# Patient Record
Sex: FEMALE | Race: BLACK OR AFRICAN AMERICAN
[De-identification: names, ages, dates, MRNs, and addresses within clinical notes are randomized per-mention and may not be internally consistent; named-entity substitution may affect disease eponyms.]

---

## 2017-04-19 ENCOUNTER — HOSPITAL ENCOUNTER (OUTPATIENT)
Dept: HOSPITAL 23 - CCAT | Age: 37
Discharge: HOME | End: 2017-04-19
Payer: COMMERCIAL

## 2017-04-19 DIAGNOSIS — R55: Primary | ICD-10-CM

## 2017-04-19 DIAGNOSIS — R53.83: ICD-10-CM

## 2020-02-13 ENCOUNTER — APPOINTMENT (OUTPATIENT)
Dept: WOMENS IMAGING | Facility: HOSPITAL | Age: 40
End: 2020-02-13

## 2020-02-13 PROCEDURE — 77063 BREAST TOMOSYNTHESIS BI: CPT | Performed by: RADIOLOGY

## 2020-02-13 PROCEDURE — 77067 SCR MAMMO BI INCL CAD: CPT | Performed by: RADIOLOGY

## 2020-09-18 ENCOUNTER — TRANSCRIBE ORDERS (OUTPATIENT)
Dept: PREADMISSION TESTING | Facility: HOSPITAL | Age: 40
End: 2020-09-18

## 2020-09-18 DIAGNOSIS — Z01.818 OTHER SPECIFIED PRE-OPERATIVE EXAMINATION: Primary | ICD-10-CM

## 2020-09-22 ENCOUNTER — APPOINTMENT (OUTPATIENT)
Dept: PREADMISSION TESTING | Facility: HOSPITAL | Age: 40
End: 2020-09-22

## 2020-09-22 VITALS
RESPIRATION RATE: 16 BRPM | TEMPERATURE: 99 F | WEIGHT: 208 LBS | HEIGHT: 67 IN | OXYGEN SATURATION: 100 % | HEART RATE: 72 BPM | DIASTOLIC BLOOD PRESSURE: 82 MMHG | BODY MASS INDEX: 32.65 KG/M2 | SYSTOLIC BLOOD PRESSURE: 132 MMHG

## 2020-09-22 LAB
ABO GROUP BLD: NORMAL
BLD GP AB SCN SERPL QL: NEGATIVE
DEPRECATED RDW RBC AUTO: 44.5 FL (ref 37–54)
ERYTHROCYTE [DISTWIDTH] IN BLOOD BY AUTOMATED COUNT: 16.1 % (ref 12.3–15.4)
HCG SERPL QL: NEGATIVE
HCT VFR BLD AUTO: 33.4 % (ref 34–46.6)
HGB BLD-MCNC: 10.2 G/DL (ref 12–15.9)
MCH RBC QN AUTO: 23.4 PG (ref 26.6–33)
MCHC RBC AUTO-ENTMCNC: 30.5 G/DL (ref 31.5–35.7)
MCV RBC AUTO: 76.6 FL (ref 79–97)
PLATELET # BLD AUTO: 205 10*3/MM3 (ref 140–450)
PMV BLD AUTO: 10 FL (ref 6–12)
RBC # BLD AUTO: 4.36 10*6/MM3 (ref 3.77–5.28)
RH BLD: POSITIVE
T&S EXPIRATION DATE: NORMAL
WBC # BLD AUTO: 4.82 10*3/MM3 (ref 3.4–10.8)

## 2020-09-22 PROCEDURE — 84703 CHORIONIC GONADOTROPIN ASSAY: CPT | Performed by: OBSTETRICS & GYNECOLOGY

## 2020-09-22 PROCEDURE — 86901 BLOOD TYPING SEROLOGIC RH(D): CPT | Performed by: OBSTETRICS & GYNECOLOGY

## 2020-09-22 PROCEDURE — 86900 BLOOD TYPING SEROLOGIC ABO: CPT | Performed by: OBSTETRICS & GYNECOLOGY

## 2020-09-22 PROCEDURE — 36415 COLL VENOUS BLD VENIPUNCTURE: CPT

## 2020-09-22 PROCEDURE — 86850 RBC ANTIBODY SCREEN: CPT | Performed by: OBSTETRICS & GYNECOLOGY

## 2020-09-22 PROCEDURE — 85027 COMPLETE CBC AUTOMATED: CPT | Performed by: OBSTETRICS & GYNECOLOGY

## 2020-09-22 NOTE — DISCHARGE INSTRUCTIONS
Take the following medications the morning of surgery:  NONE    ARRIVAL TIME TO MAIN SURGERY ON 9/28/20 IS 5:30 AM      If you are on prescription narcotic pain medication to control your pain you may also take that medication the morning of surgery.    General Instructions:  • Do not eat solid food after midnight the night before surgery.  • You may drink clear liquids day of surgery but must stop at least one hour before your hospital arrival time.  • It is beneficial for you to have a clear drink that contains carbohydrates the day of surgery.  We suggest a 12 to 20 ounce bottle of Gatorade or Powerade for non-diabetic patients or a 12 to 20 ounce bottle of G2 or Powerade Zero for diabetic patients. (Pediatric patients, are not advised to drink a 12 to 20 ounce carbohydrate drink)    Clear liquids are liquids you can see through.  Nothing red in color.     Plain water                               Sports drinks  Sodas                                   Gelatin (Jell-O)  Fruit juices without pulp such as white grape juice and apple juice  Popsicles that contain no fruit or yogurt  Tea or coffee (no cream or milk added)  Gatorade / Powerade  G2 / Powerade Zero    • Infants may have breast milk up to four hours before surgery.  • Infants drinking formula may drink formula up to six hours before surgery.   • Patients who avoid smoking, chewing tobacco and alcohol for 4 weeks prior to surgery have a reduced risk of post-operative complications.  Quit smoking as many days before surgery as you can.  • Do not smoke, use chewing tobacco or drink alcohol the day of surgery.   • If applicable bring your C-PAP/ BI-PAP machine.  • Bring any papers given to you in the doctor’s office.  • Wear clean comfortable clothes.  • Do not wear contact lenses, false eyelashes or make-up.  Bring a case for your glasses.   • Bring crutches or walker if applicable.  • Remove all piercings.  Leave jewelry and any other valuables at  home.  • Hair extensions with metal clips must be removed prior to surgery.  • The Pre-Admission Testing nurse will instruct you to bring medications if unable to obtain an accurate list in Pre-Admission Testing.        If you were given a blood bank ID arm band remember to bring it with you the day of surgery.    Preventing a Surgical Site Infection:  • For 2 to 3 days before surgery, avoid shaving with a razor because the razor can irritate skin and make it easier to develop an infection.    • Any areas of open skin can increase the risk of a post-operative wound infection by allowing bacteria to enter and travel throughout the body.  Notify your surgeon if you have any skin wounds / rashes even if it is not near the expected surgical site.  The area will need assessed to determine if surgery should be delayed until it is healed.  • The night prior to surgery shower using a fresh bar of anti-bacterial soap (such as Dial) and clean washcloth.  Sleep in a clean bed with clean clothing.  Do not allow pets to sleep with you.  • Shower on the morning of surgery using a fresh bar of anti-bacterial soap (such as Dial) and clean washcloth.  Dry with a clean towel and dress in clean clothing.  • Ask your surgeon if you will be receiving antibiotics prior to surgery.  • Make sure you, your family, and all healthcare providers clean their hands with soap and water or an alcohol based hand  before caring for you or your wound.    Day of surgery:  Your arrival time is approximately two hours before your scheduled surgery time.  Upon arrival, a Pre-op nurse and Anesthesiologist will review your health history, obtain vital signs, and answer questions you may have.  The only belongings needed at this time will be a list of your home medications and if applicable your C-PAP/BI-PAP machine.  If you are staying overnight your family can leave the rest of your belongings in the car and bring them to your room later.  A  Pre-op nurse will start an IV and you may receive medication in preparation for surgery, including something to help you relax.  Your family will be able to see you in the Pre-op area.  Two visitors at a time will be allowed in the Pre-op room.  While you are in surgery your family should notify the waiting room  if they leave the waiting room area and provide a contact phone number.    Please be aware that surgery does come with discomfort.  We want to make every effort to control your discomfort so please discuss any uncontrolled symptoms with your nurse.   Your doctor will most likely have prescribed pain medications.      If you are going home after surgery you will receive individualized written care instructions before being discharged.  A responsible adult must drive you to and from the hospital on the day of your surgery and stay with you for 24 hours.    If you are staying overnight following surgery, you will be transported to your hospital room following the recovery period.  [facility] has all private rooms.    If you have any questions please call Pre-Admission Testing at [phone].  Deductibles and co-payments are collected on the day of service. Please be prepared to pay the required co-pay, deductible or deposit on the day of service as defined by your plan.    Patient Education for Self-Quarantine Process    Following your COVID testing, we strongly recommend that you do not leave your home after you have been tested for COVID except to get medical care. This includes not going to work, school or to public areas.  If this is not possible for you to do please limit your activities to only required outings.  Be sure to wear a mask when you are with other people, practice social distancing and wash your hands frequently.      The following items provide additional details to keep you safe.  • Wash your hands with soap and water frequently for at least 20 seconds.    • Avoid touching your eyes, nose and mouth with unwashed hands.  • Do not share anything - utensils, towels, food from the same bowl.   • Have your own utensils, drinking glass, dishes, towels and bedding.   • Do not have visitors.   • Do use FaceTime to stay in touch with family and friends.  • You should stay in a specific room away from others if possible.   • Stay at least 6 feet away from others in the home if you cannot have a dedicated room to yourself.   • Do not snuggle with your pet. While the CDC says there is no evidence that pets can spread COVID-19 or be infected from humans, it is probably best to avoid “petting, snuggling, being kissed or licked and sharing food (during self-quarantine)”, according to the CDC.   • Sanitize household surfaces daily. Include all high touch areas (door handles, light switches, phones, countertops, etc.)  • Do not share a bathroom with others, if possible.   • Wear a mask around others in your home if you are unable to stay in a separate room or 6 feet apart. If  you are unable to wear a mask, have your family member wear a mask if they must be within 6 feet of you.   Call your surgeon immediately if you experience any of the following symptoms:  • Sore Throat  • Shortness of Breath or difficulty breathing  • Cough  • Chills  • Body soreness or muscle pain  • Headache  • Fever  • New loss of taste or smell  • Do not arrive for your surgery ill.  Your procedure will need to be rescheduled to another time.  You will need to call your physician before the day of surgery to avoid any unnecessary exposure to hospital staff as well as other patients.

## 2020-09-25 ENCOUNTER — LAB (OUTPATIENT)
Dept: LAB | Facility: HOSPITAL | Age: 40
End: 2020-09-25

## 2020-09-25 DIAGNOSIS — Z01.818 OTHER SPECIFIED PRE-OPERATIVE EXAMINATION: ICD-10-CM

## 2020-09-25 PROCEDURE — C9803 HOPD COVID-19 SPEC COLLECT: HCPCS

## 2020-09-25 PROCEDURE — U0004 COV-19 TEST NON-CDC HGH THRU: HCPCS

## 2020-09-26 LAB — SARS-COV-2 RNA RESP QL NAA+PROBE: NOT DETECTED

## 2020-09-28 ENCOUNTER — ANESTHESIA EVENT (OUTPATIENT)
Dept: PERIOP | Facility: HOSPITAL | Age: 40
End: 2020-09-28

## 2020-09-28 ENCOUNTER — ANESTHESIA (OUTPATIENT)
Dept: PERIOP | Facility: HOSPITAL | Age: 40
End: 2020-09-28

## 2020-09-28 ENCOUNTER — HOSPITAL ENCOUNTER (OUTPATIENT)
Facility: HOSPITAL | Age: 40
Discharge: HOME | End: 2020-09-29
Attending: OBSTETRICS & GYNECOLOGY | Admitting: OBSTETRICS & GYNECOLOGY

## 2020-09-28 DIAGNOSIS — D25.0 INTRAMURAL AND SUBMUCOUS LEIOMYOMA OF UTERUS: Primary | ICD-10-CM

## 2020-09-28 DIAGNOSIS — D21.9 FIBROIDS: ICD-10-CM

## 2020-09-28 DIAGNOSIS — D25.1 INTRAMURAL AND SUBMUCOUS LEIOMYOMA OF UTERUS: Primary | ICD-10-CM

## 2020-09-28 PROBLEM — D50.0 IRON DEFICIENCY ANEMIA DUE TO CHRONIC BLOOD LOSS: Status: ACTIVE | Noted: 2020-09-28

## 2020-09-28 PROBLEM — N92.0 MENORRHAGIA WITH REGULAR CYCLE: Status: ACTIVE | Noted: 2020-09-28

## 2020-09-28 PROBLEM — D25.9 FIBROID, UTERINE: Status: ACTIVE | Noted: 2020-09-28

## 2020-09-28 PROCEDURE — 25010000003 CEFAZOLIN IN DEXTROSE 2-4 GM/100ML-% SOLUTION: Performed by: OBSTETRICS & GYNECOLOGY

## 2020-09-28 PROCEDURE — 25010000002 DEXAMETHASONE PER 1 MG: Performed by: NURSE ANESTHETIST, CERTIFIED REGISTERED

## 2020-09-28 PROCEDURE — 25010000002 FENTANYL CITRATE (PF) 100 MCG/2ML SOLUTION: Performed by: ANESTHESIOLOGY

## 2020-09-28 PROCEDURE — 25010000002 ONDANSETRON PER 1 MG: Performed by: NURSE ANESTHETIST, CERTIFIED REGISTERED

## 2020-09-28 PROCEDURE — 25010000002 NEOSTIGMINE PER 0.5 MG: Performed by: NURSE ANESTHETIST, CERTIFIED REGISTERED

## 2020-09-28 PROCEDURE — 25010000002 HYDROMORPHONE 1 MG/ML SOLUTION: Performed by: NURSE ANESTHETIST, CERTIFIED REGISTERED

## 2020-09-28 PROCEDURE — 25010000002 PROPOFOL 10 MG/ML EMULSION: Performed by: NURSE ANESTHETIST, CERTIFIED REGISTERED

## 2020-09-28 PROCEDURE — 25010000002 ROPIVACAINE PER 1 MG: Performed by: OBSTETRICS & GYNECOLOGY

## 2020-09-28 PROCEDURE — 88307 TISSUE EXAM BY PATHOLOGIST: CPT | Performed by: OBSTETRICS & GYNECOLOGY

## 2020-09-28 PROCEDURE — 25010000002 MIDAZOLAM PER 1 MG: Performed by: ANESTHESIOLOGY

## 2020-09-28 DEVICE — KNOTLESS TISSUE CONTROL DEVICE, VIOLET UNIDIRECTIONAL (ANTIBACTERIAL) SYNTHETIC ABSORBABLE DEVICE
Type: IMPLANTABLE DEVICE | Site: ABDOMEN | Status: FUNCTIONAL
Brand: STRATAFIX

## 2020-09-28 RX ORDER — ONDANSETRON 4 MG/1
4 TABLET, FILM COATED ORAL EVERY 6 HOURS PRN
Status: DISCONTINUED | OUTPATIENT
Start: 2020-09-28 | End: 2020-09-29 | Stop reason: HOSPADM

## 2020-09-28 RX ORDER — HYDROMORPHONE HYDROCHLORIDE 1 MG/ML
0.5 INJECTION, SOLUTION INTRAMUSCULAR; INTRAVENOUS; SUBCUTANEOUS
Status: DISCONTINUED | OUTPATIENT
Start: 2020-09-28 | End: 2020-09-29 | Stop reason: HOSPADM

## 2020-09-28 RX ORDER — CEFAZOLIN SODIUM 2 G/100ML
2 INJECTION, SOLUTION INTRAVENOUS ONCE
Status: COMPLETED | OUTPATIENT
Start: 2020-09-28 | End: 2020-09-28

## 2020-09-28 RX ORDER — DEXAMETHASONE SODIUM PHOSPHATE 4 MG/ML
INJECTION, SOLUTION INTRA-ARTICULAR; INTRALESIONAL; INTRAMUSCULAR; INTRAVENOUS; SOFT TISSUE AS NEEDED
Status: DISCONTINUED | OUTPATIENT
Start: 2020-09-28 | End: 2020-09-28 | Stop reason: SURG

## 2020-09-28 RX ORDER — ONDANSETRON 2 MG/ML
4 INJECTION INTRAMUSCULAR; INTRAVENOUS EVERY 6 HOURS PRN
Status: DISCONTINUED | OUTPATIENT
Start: 2020-09-28 | End: 2020-09-29 | Stop reason: HOSPADM

## 2020-09-28 RX ORDER — CALCIUM CARBONATE 200(500)MG
2 TABLET,CHEWABLE ORAL 3 TIMES DAILY PRN
Status: DISCONTINUED | OUTPATIENT
Start: 2020-09-28 | End: 2020-09-29 | Stop reason: HOSPADM

## 2020-09-28 RX ORDER — SODIUM CHLORIDE, SODIUM LACTATE, POTASSIUM CHLORIDE, CALCIUM CHLORIDE 600; 310; 30; 20 MG/100ML; MG/100ML; MG/100ML; MG/100ML
9 INJECTION, SOLUTION INTRAVENOUS CONTINUOUS
Status: DISCONTINUED | OUTPATIENT
Start: 2020-09-28 | End: 2020-09-28 | Stop reason: HOSPADM

## 2020-09-28 RX ORDER — DIPHENHYDRAMINE HCL 25 MG
25 CAPSULE ORAL
Status: DISCONTINUED | OUTPATIENT
Start: 2020-09-28 | End: 2020-09-28 | Stop reason: HOSPADM

## 2020-09-28 RX ORDER — ONDANSETRON 2 MG/ML
INJECTION INTRAMUSCULAR; INTRAVENOUS AS NEEDED
Status: DISCONTINUED | OUTPATIENT
Start: 2020-09-28 | End: 2020-09-28 | Stop reason: SURG

## 2020-09-28 RX ORDER — ROCURONIUM BROMIDE 10 MG/ML
INJECTION, SOLUTION INTRAVENOUS AS NEEDED
Status: DISCONTINUED | OUTPATIENT
Start: 2020-09-28 | End: 2020-09-28 | Stop reason: SURG

## 2020-09-28 RX ORDER — FLUMAZENIL 0.1 MG/ML
0.2 INJECTION INTRAVENOUS AS NEEDED
Status: DISCONTINUED | OUTPATIENT
Start: 2020-09-28 | End: 2020-09-28 | Stop reason: HOSPADM

## 2020-09-28 RX ORDER — SODIUM CHLORIDE 9 MG/ML
INJECTION, SOLUTION INTRAVENOUS AS NEEDED
Status: DISCONTINUED | OUTPATIENT
Start: 2020-09-28 | End: 2020-09-28 | Stop reason: HOSPADM

## 2020-09-28 RX ORDER — SCOLOPAMINE TRANSDERMAL SYSTEM 1 MG/1
1 PATCH, EXTENDED RELEASE TRANSDERMAL ONCE
Status: DISCONTINUED | OUTPATIENT
Start: 2020-09-28 | End: 2020-09-28

## 2020-09-28 RX ORDER — FENTANYL CITRATE 50 UG/ML
50 INJECTION, SOLUTION INTRAMUSCULAR; INTRAVENOUS
Status: DISCONTINUED | OUTPATIENT
Start: 2020-09-28 | End: 2020-09-28 | Stop reason: HOSPADM

## 2020-09-28 RX ORDER — HYDROMORPHONE HYDROCHLORIDE 1 MG/ML
0.5 INJECTION, SOLUTION INTRAMUSCULAR; INTRAVENOUS; SUBCUTANEOUS
Status: DISCONTINUED | OUTPATIENT
Start: 2020-09-28 | End: 2020-09-28 | Stop reason: HOSPADM

## 2020-09-28 RX ORDER — IBUPROFEN 600 MG/1
600 TABLET ORAL EVERY 6 HOURS PRN
Status: DISCONTINUED | OUTPATIENT
Start: 2020-09-28 | End: 2020-09-29 | Stop reason: HOSPADM

## 2020-09-28 RX ORDER — ONDANSETRON 2 MG/ML
4 INJECTION INTRAMUSCULAR; INTRAVENOUS ONCE AS NEEDED
Status: DISCONTINUED | OUTPATIENT
Start: 2020-09-28 | End: 2020-09-28 | Stop reason: HOSPADM

## 2020-09-28 RX ORDER — LABETALOL HYDROCHLORIDE 5 MG/ML
10 INJECTION, SOLUTION INTRAVENOUS ONCE
Status: DISCONTINUED | OUTPATIENT
Start: 2020-09-28 | End: 2020-09-28 | Stop reason: HOSPADM

## 2020-09-28 RX ORDER — OXYCODONE AND ACETAMINOPHEN 7.5; 325 MG/1; MG/1
1 TABLET ORAL ONCE AS NEEDED
Status: DISCONTINUED | OUTPATIENT
Start: 2020-09-28 | End: 2020-09-28 | Stop reason: HOSPADM

## 2020-09-28 RX ORDER — PROMETHAZINE HYDROCHLORIDE 25 MG/1
25 TABLET ORAL ONCE AS NEEDED
Status: DISCONTINUED | OUTPATIENT
Start: 2020-09-28 | End: 2020-09-28 | Stop reason: HOSPADM

## 2020-09-28 RX ORDER — GLYCOPYRROLATE 0.2 MG/ML
INJECTION INTRAMUSCULAR; INTRAVENOUS AS NEEDED
Status: DISCONTINUED | OUTPATIENT
Start: 2020-09-28 | End: 2020-09-28 | Stop reason: SURG

## 2020-09-28 RX ORDER — MIDAZOLAM HYDROCHLORIDE 1 MG/ML
1 INJECTION INTRAMUSCULAR; INTRAVENOUS
Status: DISCONTINUED | OUTPATIENT
Start: 2020-09-28 | End: 2020-09-28 | Stop reason: HOSPADM

## 2020-09-28 RX ORDER — OXYCODONE AND ACETAMINOPHEN 10; 325 MG/1; MG/1
1 TABLET ORAL EVERY 4 HOURS PRN
Status: DISCONTINUED | OUTPATIENT
Start: 2020-09-28 | End: 2020-09-29 | Stop reason: HOSPADM

## 2020-09-28 RX ORDER — FAMOTIDINE 10 MG/ML
20 INJECTION, SOLUTION INTRAVENOUS ONCE
Status: COMPLETED | OUTPATIENT
Start: 2020-09-28 | End: 2020-09-28

## 2020-09-28 RX ORDER — LIDOCAINE HYDROCHLORIDE 10 MG/ML
0.5 INJECTION, SOLUTION EPIDURAL; INFILTRATION; INTRACAUDAL; PERINEURAL ONCE AS NEEDED
Status: DISCONTINUED | OUTPATIENT
Start: 2020-09-28 | End: 2020-09-28 | Stop reason: HOSPADM

## 2020-09-28 RX ORDER — SODIUM CHLORIDE 0.9 % (FLUSH) 0.9 %
3 SYRINGE (ML) INJECTION EVERY 12 HOURS SCHEDULED
Status: DISCONTINUED | OUTPATIENT
Start: 2020-09-28 | End: 2020-09-28 | Stop reason: HOSPADM

## 2020-09-28 RX ORDER — SODIUM CHLORIDE 0.9 % (FLUSH) 0.9 %
3-10 SYRINGE (ML) INJECTION AS NEEDED
Status: DISCONTINUED | OUTPATIENT
Start: 2020-09-28 | End: 2020-09-28 | Stop reason: HOSPADM

## 2020-09-28 RX ORDER — DIPHENHYDRAMINE HYDROCHLORIDE 50 MG/ML
12.5 INJECTION INTRAMUSCULAR; INTRAVENOUS
Status: DISCONTINUED | OUTPATIENT
Start: 2020-09-28 | End: 2020-09-28 | Stop reason: HOSPADM

## 2020-09-28 RX ORDER — TEMAZEPAM 15 MG/1
15 CAPSULE ORAL NIGHTLY PRN
Status: DISCONTINUED | OUTPATIENT
Start: 2020-09-28 | End: 2020-09-29 | Stop reason: HOSPADM

## 2020-09-28 RX ORDER — PROMETHAZINE HYDROCHLORIDE 25 MG/1
25 SUPPOSITORY RECTAL ONCE AS NEEDED
Status: DISCONTINUED | OUTPATIENT
Start: 2020-09-28 | End: 2020-09-28 | Stop reason: HOSPADM

## 2020-09-28 RX ORDER — SODIUM CHLORIDE, SODIUM LACTATE, POTASSIUM CHLORIDE, CALCIUM CHLORIDE 600; 310; 30; 20 MG/100ML; MG/100ML; MG/100ML; MG/100ML
100 INJECTION, SOLUTION INTRAVENOUS CONTINUOUS
Status: DISCONTINUED | OUTPATIENT
Start: 2020-09-28 | End: 2020-09-29 | Stop reason: HOSPADM

## 2020-09-28 RX ORDER — HYDROCODONE BITARTRATE AND ACETAMINOPHEN 7.5; 325 MG/1; MG/1
1 TABLET ORAL ONCE AS NEEDED
Status: DISCONTINUED | OUTPATIENT
Start: 2020-09-28 | End: 2020-09-28 | Stop reason: HOSPADM

## 2020-09-28 RX ORDER — EPHEDRINE SULFATE 50 MG/ML
5 INJECTION, SOLUTION INTRAVENOUS ONCE AS NEEDED
Status: DISCONTINUED | OUTPATIENT
Start: 2020-09-28 | End: 2020-09-28 | Stop reason: HOSPADM

## 2020-09-28 RX ORDER — MAGNESIUM HYDROXIDE 1200 MG/15ML
LIQUID ORAL AS NEEDED
Status: DISCONTINUED | OUTPATIENT
Start: 2020-09-28 | End: 2020-09-28 | Stop reason: HOSPADM

## 2020-09-28 RX ORDER — OXYCODONE HYDROCHLORIDE AND ACETAMINOPHEN 5; 325 MG/1; MG/1
1 TABLET ORAL EVERY 4 HOURS PRN
Status: DISCONTINUED | OUTPATIENT
Start: 2020-09-28 | End: 2020-09-29 | Stop reason: HOSPADM

## 2020-09-28 RX ORDER — NALOXONE HCL 0.4 MG/ML
0.2 VIAL (ML) INJECTION AS NEEDED
Status: DISCONTINUED | OUTPATIENT
Start: 2020-09-28 | End: 2020-09-28 | Stop reason: HOSPADM

## 2020-09-28 RX ORDER — SIMETHICONE 80 MG
80 TABLET,CHEWABLE ORAL 4 TIMES DAILY PRN
Status: DISCONTINUED | OUTPATIENT
Start: 2020-09-28 | End: 2020-09-29 | Stop reason: HOSPADM

## 2020-09-28 RX ORDER — PROPOFOL 10 MG/ML
VIAL (ML) INTRAVENOUS AS NEEDED
Status: DISCONTINUED | OUTPATIENT
Start: 2020-09-28 | End: 2020-09-28 | Stop reason: SURG

## 2020-09-28 RX ORDER — LABETALOL HYDROCHLORIDE 5 MG/ML
5 INJECTION, SOLUTION INTRAVENOUS
Status: DISCONTINUED | OUTPATIENT
Start: 2020-09-28 | End: 2020-09-28 | Stop reason: HOSPADM

## 2020-09-28 RX ORDER — NALOXONE HCL 0.4 MG/ML
0.1 VIAL (ML) INJECTION
Status: DISCONTINUED | OUTPATIENT
Start: 2020-09-28 | End: 2020-09-29 | Stop reason: HOSPADM

## 2020-09-28 RX ORDER — LIDOCAINE HYDROCHLORIDE 20 MG/ML
INJECTION, SOLUTION INFILTRATION; PERINEURAL AS NEEDED
Status: DISCONTINUED | OUTPATIENT
Start: 2020-09-28 | End: 2020-09-28 | Stop reason: SURG

## 2020-09-28 RX ORDER — ROPIVACAINE HYDROCHLORIDE 5 MG/ML
INJECTION, SOLUTION EPIDURAL; INFILTRATION; PERINEURAL AS NEEDED
Status: DISCONTINUED | OUTPATIENT
Start: 2020-09-28 | End: 2020-09-28 | Stop reason: HOSPADM

## 2020-09-28 RX ADMIN — FENTANYL CITRATE 100 MCG: 50 INJECTION INTRAMUSCULAR; INTRAVENOUS at 07:39

## 2020-09-28 RX ADMIN — GLYCOPYRROLATE 0.2 MG: 0.2 INJECTION INTRAMUSCULAR; INTRAVENOUS at 08:34

## 2020-09-28 RX ADMIN — PROPOFOL 25 MCG/KG/MIN: 10 INJECTION, EMULSION INTRAVENOUS at 07:50

## 2020-09-28 RX ADMIN — SODIUM CHLORIDE, POTASSIUM CHLORIDE, SODIUM LACTATE AND CALCIUM CHLORIDE 100 ML/HR: 600; 310; 30; 20 INJECTION, SOLUTION INTRAVENOUS at 15:44

## 2020-09-28 RX ADMIN — ROCURONIUM BROMIDE 10 MG: 10 INJECTION INTRAVENOUS at 09:49

## 2020-09-28 RX ADMIN — OXYCODONE HYDROCHLORIDE AND ACETAMINOPHEN 1 TABLET: 10; 325 TABLET ORAL at 20:35

## 2020-09-28 RX ADMIN — IBUPROFEN 600 MG: 600 TABLET, FILM COATED ORAL at 13:04

## 2020-09-28 RX ADMIN — ROCURONIUM BROMIDE 20 MG: 10 INJECTION INTRAVENOUS at 08:52

## 2020-09-28 RX ADMIN — NEOSTIGMINE METHYLSULFATE 3 MG: 1 INJECTION INTRAMUSCULAR; INTRAVENOUS; SUBCUTANEOUS at 10:00

## 2020-09-28 RX ADMIN — HYDROMORPHONE HYDROCHLORIDE 0.5 MG: 1 INJECTION, SOLUTION INTRAMUSCULAR; INTRAVENOUS; SUBCUTANEOUS at 09:18

## 2020-09-28 RX ADMIN — DEXAMETHASONE SODIUM PHOSPHATE 10 MG: 4 INJECTION INTRA-ARTICULAR; INTRALESIONAL; INTRAMUSCULAR; INTRAVENOUS; SOFT TISSUE at 08:00

## 2020-09-28 RX ADMIN — SODIUM CHLORIDE, POTASSIUM CHLORIDE, SODIUM LACTATE AND CALCIUM CHLORIDE 9 ML/HR: 600; 310; 30; 20 INJECTION, SOLUTION INTRAVENOUS at 06:51

## 2020-09-28 RX ADMIN — ONDANSETRON HYDROCHLORIDE 4 MG: 2 SOLUTION INTRAMUSCULAR; INTRAVENOUS at 09:55

## 2020-09-28 RX ADMIN — PROPOFOL 50 MG: 10 INJECTION, EMULSION INTRAVENOUS at 07:43

## 2020-09-28 RX ADMIN — CEFAZOLIN SODIUM 2 G: 2 INJECTION, SOLUTION INTRAVENOUS at 07:28

## 2020-09-28 RX ADMIN — OXYCODONE HYDROCHLORIDE AND ACETAMINOPHEN 1 TABLET: 10; 325 TABLET ORAL at 15:47

## 2020-09-28 RX ADMIN — ROCURONIUM BROMIDE 40 MG: 10 INJECTION INTRAVENOUS at 07:39

## 2020-09-28 RX ADMIN — MIDAZOLAM 1 MG: 1 INJECTION INTRAMUSCULAR; INTRAVENOUS at 06:54

## 2020-09-28 RX ADMIN — GLYCOPYRROLATE 0.4 MG: 0.2 INJECTION INTRAMUSCULAR; INTRAVENOUS at 10:00

## 2020-09-28 RX ADMIN — SODIUM CHLORIDE, POTASSIUM CHLORIDE, SODIUM LACTATE AND CALCIUM CHLORIDE 9 ML/HR: 600; 310; 30; 20 INJECTION, SOLUTION INTRAVENOUS at 06:58

## 2020-09-28 RX ADMIN — HYDROMORPHONE HYDROCHLORIDE 0.5 MG: 1 INJECTION, SOLUTION INTRAMUSCULAR; INTRAVENOUS; SUBCUTANEOUS at 10:00

## 2020-09-28 RX ADMIN — PROPOFOL 150 MG: 10 INJECTION, EMULSION INTRAVENOUS at 07:39

## 2020-09-28 RX ADMIN — ROCURONIUM BROMIDE 10 MG: 10 INJECTION INTRAVENOUS at 07:43

## 2020-09-28 RX ADMIN — LIDOCAINE HYDROCHLORIDE 100 MG: 20 INJECTION, SOLUTION INFILTRATION; PERINEURAL at 07:39

## 2020-09-28 RX ADMIN — FAMOTIDINE 20 MG: 10 INJECTION INTRAVENOUS at 06:54

## 2020-09-28 RX ADMIN — SCOPALAMINE 1 PATCH: 1 PATCH, EXTENDED RELEASE TRANSDERMAL at 06:51

## 2020-09-28 NOTE — ANESTHESIA PREPROCEDURE EVALUATION
Anesthesia Evaluation     Patient summary reviewed and Nursing notes reviewed   no history of anesthetic complications:  NPO Solid Status: > 8 hours  NPO Liquid Status: > 2 hours           Airway   Mallampati: II  Dental - normal exam     Pulmonary - negative pulmonary ROS and normal exam   Cardiovascular - negative cardio ROS and normal exam        Neuro/Psych- negative ROS  GI/Hepatic/Renal/Endo - negative ROS     Musculoskeletal     Abdominal    Substance History      OB/GYN          Other                        Anesthesia Plan    ASA 2     general     intravenous induction     Anesthetic plan, all risks, benefits, and alternatives have been provided, discussed and informed consent has been obtained with: patient.    Plan discussed with CRNA.

## 2020-09-28 NOTE — ANESTHESIA PROCEDURE NOTES
Airway  Urgency: elective    Date/Time: 9/28/2020 7:47 AM    General Information and Staff    Patient location during procedure: OR  Anesthesiologist: Arya Yadav MD  CRNA: Dayami Henson CRNA    Indications and Patient Condition  Indications for airway management: airway protection    Preoxygenated: yes  Mask difficulty assessment: 2 - vent by mask + OA or adjuvant +/- NMBA    Final Airway Details  Final airway type: endotracheal airway      Successful airway: ETT  Cuffed: yes   Successful intubation technique: direct laryngoscopy and video laryngoscopy  Endotracheal tube insertion site: oral  Blade: CMAC  Blade size: D  ETT size (mm): 7.0  Cormack-Lehane Classification: grade I - full view of glottis  Placement verified by: chest auscultation and capnometry   Cuff volume (mL): 6  Measured from: lips  ETT/EBT  to lips (cm): 21  Number of attempts at approach: 3 or more  Assessment: lips, teeth, and gum same as pre-op and atraumatic intubation    Additional Comments  First attempt per HUY Blanco w/ Mac 3 blade- grade III view. Second attempt per SRNA w/ [place]on 2 blade- grade I view, unable to pass tube through vocal cords. Attempt per CRNA w/ magallon 2 blade, also unable to pass ETT through vocal cords. Successful atraumatic intubation per CRNA w/ EVER D blade and CMAC stylet. Lips and teeth same as pre op. Equal BBS and + EtCO2.

## 2020-09-28 NOTE — ANESTHESIA POSTPROCEDURE EVALUATION
Patient: Chris Connelly    Procedure Summary     Date: 09/28/20 Room / Location: [facility] OR  / [facility] MAIN OR    Anesthesia Start: 0728 Anesthesia Stop: 1035    Procedure: TOTAL LAPAROSCOPIC HYSTERECTOMY BILATERAL SALPINGECTOMY WITH DAVINCI ROBOT (N/A Abdomen) Diagnosis:     Surgeon: Vicente Hurst MD Provider: Arya Yadav MD    Anesthesia Type: general ASA Status: 2          Anesthesia Type: general    Vitals  Vitals Value Taken Time   /75 09/28/20 1115   Temp 37.1 °C (98.7 °F) 09/28/20 1032   Pulse 73 09/28/20 1122   Resp 16 09/28/20 1100   SpO2 96 % 09/28/20 1122   Vitals shown include unvalidated device data.        Post Anesthesia Care and Evaluation    Patient location during evaluation: PACU  Patient participation: complete - patient participated  Level of consciousness: awake and alert  Pain management: adequate  Airway patency: patent  Anesthetic complications: No anesthetic complications  PONV Status: none  Cardiovascular status: acceptable  Respiratory status: acceptable  Hydration status: acceptable

## 2020-09-29 VITALS
BODY MASS INDEX: 31.66 KG/M2 | DIASTOLIC BLOOD PRESSURE: 65 MMHG | TEMPERATURE: 97.7 F | HEART RATE: 75 BPM | WEIGHT: 202.16 LBS | SYSTOLIC BLOOD PRESSURE: 104 MMHG | OXYGEN SATURATION: 98 % | RESPIRATION RATE: 16 BRPM

## 2020-09-29 LAB
ANION GAP SERPL CALCULATED.3IONS-SCNC: 4.7 MMOL/L (ref 5–15)
BUN SERPL-MCNC: 7 MG/DL (ref 6–20)
BUN/CREAT SERPL: 9.2 (ref 7–25)
CALCIUM SPEC-SCNC: 8.1 MG/DL (ref 8.6–10.5)
CHLORIDE SERPL-SCNC: 103 MMOL/L (ref 98–107)
CO2 SERPL-SCNC: 27.3 MMOL/L (ref 22–29)
CREAT SERPL-MCNC: 0.76 MG/DL (ref 0.57–1)
DEPRECATED RDW RBC AUTO: 43 FL (ref 37–54)
ERYTHROCYTE [DISTWIDTH] IN BLOOD BY AUTOMATED COUNT: 15.9 % (ref 12.3–15.4)
GFR SERPL CREATININE-BSD FRML MDRD: 102 ML/MIN/1.73
GLUCOSE SERPL-MCNC: 111 MG/DL (ref 65–99)
HCT VFR BLD AUTO: 28.9 % (ref 34–46.6)
HGB BLD-MCNC: 8.9 G/DL (ref 12–15.9)
LAB AP CASE REPORT: NORMAL
MCH RBC QN AUTO: 23 PG (ref 26.6–33)
MCHC RBC AUTO-ENTMCNC: 30.8 G/DL (ref 31.5–35.7)
MCV RBC AUTO: 74.7 FL (ref 79–97)
PATH REPORT.FINAL DX SPEC: NORMAL
PATH REPORT.GROSS SPEC: NORMAL
PLATELET # BLD AUTO: 215 10*3/MM3 (ref 140–450)
PMV BLD AUTO: 10 FL (ref 6–12)
POTASSIUM SERPL-SCNC: 3.4 MMOL/L (ref 3.5–5.2)
RBC # BLD AUTO: 3.87 10*6/MM3 (ref 3.77–5.28)
SODIUM SERPL-SCNC: 135 MMOL/L (ref 136–145)
WBC # BLD AUTO: 7.16 10*3/MM3 (ref 3.4–10.8)

## 2020-09-29 PROCEDURE — 80048 BASIC METABOLIC PNL TOTAL CA: CPT | Performed by: OBSTETRICS & GYNECOLOGY

## 2020-09-29 PROCEDURE — 85027 COMPLETE CBC AUTOMATED: CPT | Performed by: OBSTETRICS & GYNECOLOGY

## 2020-09-29 RX ORDER — OXYCODONE HYDROCHLORIDE AND ACETAMINOPHEN 5; 325 MG/1; MG/1
1-2 TABLET ORAL EVERY 4 HOURS PRN
Qty: 26 TABLET | Refills: 0 | Status: SHIPPED | OUTPATIENT
Start: 2020-09-29 | End: 2020-10-06

## 2020-09-29 RX ORDER — IBUPROFEN 600 MG/1
600 TABLET ORAL EVERY 6 HOURS PRN
Qty: 30 TABLET | Refills: 1 | Status: SHIPPED | OUTPATIENT
Start: 2020-09-29

## 2020-09-29 RX ADMIN — SODIUM CHLORIDE, POTASSIUM CHLORIDE, SODIUM LACTATE AND CALCIUM CHLORIDE 100 ML/HR: 600; 310; 30; 20 INJECTION, SOLUTION INTRAVENOUS at 02:33

## 2020-09-29 RX ADMIN — OXYCODONE HYDROCHLORIDE AND ACETAMINOPHEN 1 TABLET: 10; 325 TABLET ORAL at 09:04

## 2020-09-29 RX ADMIN — OXYCODONE HYDROCHLORIDE AND ACETAMINOPHEN 1 TABLET: 10; 325 TABLET ORAL at 02:36

## 2020-09-29 RX ADMIN — IBUPROFEN 600 MG: 600 TABLET, FILM COATED ORAL at 00:47

## 2020-09-29 RX ADMIN — IBUPROFEN 600 MG: 600 TABLET, FILM COATED ORAL at 11:23

## 2021-02-18 ENCOUNTER — APPOINTMENT (OUTPATIENT)
Dept: WOMENS IMAGING | Facility: HOSPITAL | Age: 41
End: 2021-02-18

## 2021-02-18 PROCEDURE — 77067 SCR MAMMO BI INCL CAD: CPT | Performed by: RADIOLOGY

## 2021-02-18 PROCEDURE — 77063 BREAST TOMOSYNTHESIS BI: CPT | Performed by: RADIOLOGY

## 2021-03-30 ENCOUNTER — APPOINTMENT (OUTPATIENT)
Dept: WOMENS IMAGING | Facility: HOSPITAL | Age: 41
End: 2021-03-30

## 2021-03-30 PROCEDURE — 77065 DX MAMMO INCL CAD UNI: CPT | Performed by: RADIOLOGY

## 2021-03-30 PROCEDURE — 76641 ULTRASOUND BREAST COMPLETE: CPT | Performed by: RADIOLOGY

## 2021-03-30 PROCEDURE — G0279 TOMOSYNTHESIS, MAMMO: HCPCS | Performed by: RADIOLOGY

## 2021-03-30 PROCEDURE — 77061 BREAST TOMOSYNTHESIS UNI: CPT | Performed by: RADIOLOGY

## (undated) DEVICE — GLV SURG SIGNATURE ESSENTIAL PF LTX SZ6

## (undated) DEVICE — COLUMN DRAPE

## (undated) DEVICE — CANNULA SEAL

## (undated) DEVICE — ENDOPATH XCEL BLADELESS TROCARS WITH STABILITY SLEEVES: Brand: ENDOPATH XCEL

## (undated) DEVICE — BLADELESS OBTURATOR: Brand: WECK VISTA

## (undated) DEVICE — MEDICINE CUP, GRADUATED, STER: Brand: MEDLINE

## (undated) DEVICE — DRAPE,REIN 53X77,STERILE: Brand: MEDLINE

## (undated) DEVICE — SUT MNCRYL PLS ANTIB UD 4/0 PS2 18IN

## (undated) DEVICE — LOU LITHOTOMY ROBOTIC: Brand: MEDLINE INDUSTRIES, INC.

## (undated) DEVICE — TIP COVER ACCESSORY

## (undated) DEVICE — VESSEL SEALER EXTEND: Brand: ENDOWRIST

## (undated) DEVICE — ARM DRAPE

## (undated) DEVICE — SOL ANTISTICK CAUTRY ELECTROLUBE LF

## (undated) DEVICE — SKIN PREP TRAY W/CHG: Brand: MEDLINE INDUSTRIES, INC.

## (undated) DEVICE — MANIP UTER RUMI 2 KOH EFFICIENT 3.5CM BL

## (undated) DEVICE — GLV SURG BIOGEL LTX PF 5 1/2

## (undated) DEVICE — LAPAROSCOPIC SMOKE FILTRATION SYSTEM: Brand: PALL LAPAROSHIELD® PLUS LAPAROSCOPIC SMOKE FILTRATION SYSTEM

## (undated) DEVICE — CATH FOL SIMPLYLATEX SILELAST 16F 17IN

## (undated) DEVICE — MANIP UTER RUMI TP 6.7MM 10CM GRN

## (undated) DEVICE — SOL NACL 0.9PCT 1000ML